# Patient Record
Sex: FEMALE | Race: WHITE | NOT HISPANIC OR LATINO | Employment: UNEMPLOYED | URBAN - METROPOLITAN AREA
[De-identification: names, ages, dates, MRNs, and addresses within clinical notes are randomized per-mention and may not be internally consistent; named-entity substitution may affect disease eponyms.]

---

## 2018-06-19 ENCOUNTER — HOSPITAL ENCOUNTER (OUTPATIENT)
Dept: RADIOLOGY | Facility: HOSPITAL | Age: 48
Discharge: HOME | End: 2018-06-19
Attending: FAMILY MEDICINE

## 2018-06-19 ENCOUNTER — OFFICE VISIT (OUTPATIENT)
Dept: INTERNAL MEDICINE | Facility: CLINIC | Age: 48
End: 2018-06-19

## 2018-06-19 VITALS
WEIGHT: 206.81 LBS | HEIGHT: 64 IN | HEART RATE: 94 BPM | DIASTOLIC BLOOD PRESSURE: 86 MMHG | SYSTOLIC BLOOD PRESSURE: 124 MMHG | TEMPERATURE: 97 F | OXYGEN SATURATION: 98 % | BODY MASS INDEX: 35.31 KG/M2 | RESPIRATION RATE: 18 BRPM

## 2018-06-19 DIAGNOSIS — Z72.0 TOBACCO ABUSE: ICD-10-CM

## 2018-06-19 DIAGNOSIS — I83.90 VARICOSE VEIN OF LEG: ICD-10-CM

## 2018-06-19 DIAGNOSIS — R06.02 SHORTNESS OF BREATH: Primary | ICD-10-CM

## 2018-06-19 DIAGNOSIS — R06.02 SHORTNESS OF BREATH: ICD-10-CM

## 2018-06-19 PROCEDURE — 71046 X-RAY EXAM CHEST 2 VIEWS: CPT | Mod: 26,,, | Performed by: RADIOLOGY

## 2018-06-19 PROCEDURE — 99999 PR PBB SHADOW E&M-NEW PATIENT-LVL III: CPT | Mod: PBBFAC,,, | Performed by: FAMILY MEDICINE

## 2018-06-19 PROCEDURE — 99203 OFFICE O/P NEW LOW 30 MIN: CPT | Mod: S$PBB,,, | Performed by: FAMILY MEDICINE

## 2018-06-19 PROCEDURE — 99203 OFFICE O/P NEW LOW 30 MIN: CPT | Mod: PBBFAC,PO,25 | Performed by: FAMILY MEDICINE

## 2018-06-19 PROCEDURE — 71046 X-RAY EXAM CHEST 2 VIEWS: CPT | Mod: TC,PO

## 2018-06-19 RX ORDER — NAPROXEN 500 MG/1
500 TABLET ORAL 2 TIMES DAILY
Qty: 14 TABLET | Refills: 0 | Status: SHIPPED | OUTPATIENT
Start: 2018-06-19

## 2018-06-19 RX ORDER — CYCLOBENZAPRINE HCL 10 MG
10 TABLET ORAL NIGHTLY
Qty: 14 TABLET | Refills: 0 | Status: SHIPPED | OUTPATIENT
Start: 2018-06-19 | End: 2018-07-03

## 2018-06-19 NOTE — ASSESSMENT & PLAN NOTE
CXR to r/o acute process.  Pt needs labs and f/u, consider cards  But pt currently uninsured and she is unable to be placed at this time for cards.

## 2018-06-19 NOTE — ASSESSMENT & PLAN NOTE
otc compression stocking.  No history that makes me suspicious at this time of acute dvt, as her sx have been present for greater than 1 yr.  No prolonged immobilization.

## 2018-06-19 NOTE — PROGRESS NOTES
Subjective:       Patient ID: Alma Hebert is a 48 y.o. female.    Chief Complaint: Establish Care (Sharp pains chest and back)    Sleeps on 2 pillows.  States that her chest hurts on taking a deep breath.      Shortness of Breath   This is a new problem. The current episode started more than 1 year ago. The problem occurs intermittently. The problem has been waxing and waning. Associated symptoms include claudication, leg pain, leg swelling and wheezing. Pertinent negatives include no abdominal pain, chest pain, ear pain, fever, headaches, rash, rhinorrhea, sore throat, sputum production or vomiting. Nothing aggravates the symptoms. The patient has no known risk factors for DVT/PE. She has tried nothing for the symptoms.     Review of Systems   Constitutional: Negative for fever.   HENT: Negative for congestion, ear pain, rhinorrhea and sore throat.    Eyes: Negative for discharge.   Respiratory: Positive for shortness of breath and wheezing. Negative for sputum production.    Cardiovascular: Positive for claudication and leg swelling. Negative for chest pain.   Gastrointestinal: Negative for abdominal pain and vomiting.   Genitourinary: Negative for difficulty urinating.   Musculoskeletal: Negative for joint swelling.   Skin: Negative for rash.   Neurological: Negative for dizziness and headaches.   Psychiatric/Behavioral: Negative for agitation.       Objective:      Physical Exam   Constitutional: She appears well-developed and well-nourished. She appears distressed.   HENT:   Head: Normocephalic and atraumatic.   Eyes: Conjunctivae are normal. No scleral icterus.   Cardiovascular: Normal rate and regular rhythm.    Pulmonary/Chest: Effort normal and breath sounds normal. No respiratory distress. She has no wheezes.   Abdominal: Soft. Bowel sounds are normal. There is no tenderness. There is no guarding.   Musculoskeletal: Normal range of motion. She exhibits edema. She exhibits no tenderness or deformity.    Multiple superficial varicosities with collateral circulation noted.  Homans neg, no palpable cord.   Neurological: She is alert.   Skin: Skin is warm and dry. She is not diaphoretic. No erythema.   Nursing note and vitals reviewed.      Assessment:       1. Shortness of breath    2. Tobacco abuse    3. Varicose vein of leg        Plan:     Problem List Items Addressed This Visit        Cardiac/Vascular    Varicose vein of leg    Current Assessment & Plan     otc compression stocking.  No history that makes me suspicious at this time of acute dvt, as her sx have been present for greater than 1 yr.  No prolonged immobilization.           Relevant Orders    COMPRESSION STOCKINGS       Other    Tobacco abuse    Relevant Orders    Ambulatory referral to Smoking Cessation Program    Shortness of breath - Primary    Current Assessment & Plan     CXR to r/o acute process.  Pt needs labs and f/u, consider cards  But pt currently uninsured and she is unable to be placed at this time for cards.           Relevant Orders    X-Ray Chest PA And Lateral

## 2022-10-24 ENCOUNTER — HOSPITAL ENCOUNTER (EMERGENCY)
Facility: HOSPITAL | Age: 52
Discharge: HOME | End: 2022-10-24
Attending: EMERGENCY MEDICINE
Payer: MEDICAID

## 2022-10-24 VITALS
OXYGEN SATURATION: 96 % | TEMPERATURE: 98 F | DIASTOLIC BLOOD PRESSURE: 67 MMHG | BODY MASS INDEX: 34.32 KG/M2 | HEIGHT: 65 IN | RESPIRATION RATE: 18 BRPM | HEART RATE: 82 BPM | WEIGHT: 206 LBS | SYSTOLIC BLOOD PRESSURE: 125 MMHG

## 2022-10-24 DIAGNOSIS — I83.899 BLEEDING FROM VARICOSE VEIN: Primary | ICD-10-CM

## 2022-10-24 PROCEDURE — 99282 EMERGENCY DEPT VISIT SF MDM: CPT

## 2022-10-24 RX ORDER — SILVER NITRATE 38.21; 12.74 MG/1; MG/1
2 STICK TOPICAL
Status: DISCONTINUED | OUTPATIENT
Start: 2022-10-24 | End: 2022-10-24 | Stop reason: HOSPADM

## 2022-10-26 NOTE — ED PROVIDER NOTES
Encounter Date: 10/24/2022       History     Chief Complaint   Patient presents with    Leg bleeding     Patient leg started bleeding around 600 pm today that shouldn't stopped.       52-year-old female with a history of asthma comes in with bleeding from a varicose vein to the lower extremity.  The bleeding began around 6:00 p.m. and she has been holding pressure but it has not stopped yet.  She is not on any blood thinners.  No visible wound; there is still active projectile bleeding upon arrival to the emergency department.  She has no other complaints.  No injury.    Review of patient's allergies indicates:  No Known Allergies  Past Medical History:   Diagnosis Date    Asthma      Past Surgical History:   Procedure Laterality Date    TUBAL LIGATION       Family History   Problem Relation Age of Onset    Brain Hemorrhage Mother     Clotting disorder Mother     Cancer Father      Social History     Tobacco Use    Smoking status: Every Day     Packs/day: 1.00     Years: 15.00     Pack years: 15.00     Types: Cigarettes    Smokeless tobacco: Never   Substance Use Topics    Alcohol use: Yes     Alcohol/week: 2.0 standard drinks     Types: 2 Cans of beer per week    Drug use: No     Review of Systems   Constitutional:  Negative for fever.   HENT:  Negative for sore throat.    Respiratory:  Negative for shortness of breath.    Cardiovascular:  Negative for chest pain.   Gastrointestinal:  Negative for abdominal pain, diarrhea, nausea and vomiting.   Genitourinary:  Negative for dysuria.   Musculoskeletal:  Negative for back pain.   Skin:  Negative for rash.        Bleeding from varicose vein to the lower extremity   Neurological:  Negative for weakness and light-headedness.   Hematological:  Does not bruise/bleed easily.   All other systems reviewed and are negative.    Physical Exam     Initial Vitals [10/24/22 1949]   BP Pulse Resp Temp SpO2   (!) 141/69 78 16 98.1 °F (36.7 °C) 99 %      MAP       --         Physical  Exam    Nursing note and vitals reviewed.  Constitutional: She appears well-developed and well-nourished. She is not diaphoretic. No distress.   HENT:   Head: Normocephalic and atraumatic.   Eyes: EOM are normal. Pupils are equal, round, and reactive to light.   Neck: Neck supple.   Normal range of motion.  Cardiovascular:  Normal rate and regular rhythm.           Pulmonary/Chest: Breath sounds normal. She has no wheezes.   Abdominal: Abdomen is soft. Bowel sounds are normal. There is no abdominal tenderness.   Musculoskeletal:         General: No tenderness or edema. Normal range of motion.      Cervical back: Normal range of motion and neck supple.     Neurological: She is alert and oriented to person, place, and time. She has normal strength. No cranial nerve deficit or sensory deficit.   Skin: Skin is warm and dry.   Active bleeding from lower extremity varicose vein   Psychiatric: She has a normal mood and affect. Thought content normal.       ED Course   Procedures  Labs Reviewed - No data to display       Imaging Results    None          Medications - No data to display             Attending Attestation:             Attending ED Notes:   52-year-old female with a history of asthma comes in with bleeding from a varicose vein to the lower extremity.  The bleeding began around 6:00 p.m. and she has been holding pressure but it has not stopped yet.  She is not on any blood thinners.  No visible wound; there is still active projectile bleeding upon arrival to the emergency department.  She has no other complaints.  No injury.  Surgicel and pressure dressing applied with cessation of bleeding.  Patient is non-toxic appearing, in no acute distress, and vital signs are stable and normal upon discharge. Upon completion of ED evaluation and management, with consideration of thorough differential diagnosis, the patient was found to have no acutely abnormal physical exam findings or other pathology requiring further  emergent intervention or admission at this time. Patient/caregiver has no complaints upon discharge and verbalizes understanding and agreement with diagnosis and treatment plan. Discharge instructions with return precautions provided. Patient/caregiver verbalizes understanding to return to ED immediately for any new or worsening symptoms and to follow up with PCP/specialist recommended in 1-2 days.                          Clinical Impression:   Final diagnoses:  [I83.899] Bleeding from varicose vein (Primary)        ED Disposition Condition    Discharge Stable          ED Prescriptions    None       Follow-up Information       Follow up With Specialties Details Why Contact Info Additional Information    Angel Canales MD Family Medicine Schedule an appointment as soon as possible for a visit in 1 day  [address] [facility] [address]  [phone]       Vascular surgeon of your choice  Schedule an appointment as soon as possible for a visit  As needed, If symptoms worsen      [facility] - Emergency Department Emergency Medicine Go to  As needed, If symptoms worsen [facility] [facility] [address]  [phone] Floor [address]             Ella Silveira MD  10/26/22 0305

## 2024-01-15 NOTE — PROGRESS NOTES
Results have been reviewed . All labs are within normal range.   If you have any questions please feel free to contact me.  Feel that pt needs to see cardiology in future, let me know when she is ready to make this appt, and I will send referral. What Type Of Note Output Would You Prefer (Optional)?: Bullet Format How Severe Is Your Skin Lesion?: moderate Is This A New Presentation, Or A Follow-Up?: Growth Is This A New Presentation, Or A Follow-Up?: Mole Additional History: Present since birth